# Patient Record
Sex: MALE | Race: WHITE | Employment: OTHER | URBAN - NONMETROPOLITAN AREA
[De-identification: names, ages, dates, MRNs, and addresses within clinical notes are randomized per-mention and may not be internally consistent; named-entity substitution may affect disease eponyms.]

---

## 2021-06-02 ENCOUNTER — HOSPITAL ENCOUNTER (OUTPATIENT)
Dept: GENERAL RADIOLOGY | Age: 81
Discharge: HOME | End: 2021-06-02
Payer: MEDICARE

## 2021-06-02 ENCOUNTER — HOSPITAL ENCOUNTER (OUTPATIENT)
Age: 81
Discharge: HOME | End: 2021-06-02
Payer: MEDICARE

## 2021-06-02 DIAGNOSIS — M79.602 PAIN IN LEFT ARM: ICD-10-CM

## 2021-06-02 PROCEDURE — 73140 X-RAY EXAM OF FINGER(S): CPT

## 2023-01-25 ENCOUNTER — HOSPITAL ENCOUNTER (OUTPATIENT)
Age: 83
Discharge: HOME | End: 2023-01-25
Payer: MEDICARE

## 2023-01-25 LAB
ANION GAP SERPL CALC-SCNC: 10 MEQ/L (ref 8–16)
BASOPHILS ABSOLUTE: 0 THOU/MM3 (ref 0–0.1)
BASOPHILS NFR BLD AUTO: 0.5 %
BUN SERPL-MCNC: 12 MG/DL (ref 7–22)
CALCIUM SERPL-MCNC: 9.1 MG/DL (ref 8.5–10.5)
CHLORIDE SERPL-SCNC: 102 MEQ/L (ref 98–111)
CO2 SERPL-SCNC: 28 MEQ/L (ref 23–33)
CREAT SERPL-MCNC: 0.8 MG/DL (ref 0.4–1.2)
DEPRECATED RDW RBC AUTO: 44.9 FL (ref 35–45)
EOSINOPHIL NFR BLD AUTO: 2.6 %
EOSINOPHILS ABSOLUTE: 0.2 THOU/MM3 (ref 0–0.4)
ERYTHROCYTE [DISTWIDTH] IN BLOOD BY AUTOMATED COUNT: 13.4 % (ref 11.5–14.5)
GFR SERPL CREATININE-BSD FRML MDRD: > 60 ML/MIN/1.73M2
GLUCOSE SERPL-MCNC: 95 MG/DL (ref 70–108)
HCT VFR BLD AUTO: 47.2 % (ref 42–52)
HGB BLD-MCNC: 15.8 GM/DL (ref 14–18)
IMM GRANULOCYTES # BLD AUTO: 0.03 THOU/MM3 (ref 0–0.07)
IMM GRANULOCYTES NFR BLD AUTO: 0.4 %
LYMPHOCYTES ABSOLUTE: 2.1 THOU/MM3 (ref 1–4.8)
LYMPHOCYTES NFR BLD AUTO: 27.8 %
MCH RBC QN AUTO: 30.8 PG (ref 26–33)
MCHC RBC AUTO-ENTMCNC: 33.5 GM/DL (ref 32.2–35.5)
MCV RBC AUTO: 92 FL (ref 80–94)
MONOCYTES ABSOLUTE: 0.5 THOU/MM3 (ref 0.4–1.3)
MONOCYTES NFR BLD AUTO: 7.3 %
NEUTROPHILS NFR BLD AUTO: 61.4 %
NRBC BLD AUTO-RTO: 0 /100 WBC
PLATELET # BLD AUTO: 171 THOU/MM3 (ref 130–400)
PMV BLD AUTO: 11.3 FL (ref 9.4–12.4)
POTASSIUM SERPL-SCNC: 5 MEQ/L (ref 3.5–5.2)
RBC # BLD AUTO: 5.13 MILL/MM3 (ref 4.7–6.1)
SEGMENTED NEUTROPHILS ABSOLUTE COUNT: 4.5 THOU/MM3 (ref 1.8–7.7)
SODIUM SERPL-SCNC: 140 MEQ/L (ref 135–145)
WBC # BLD AUTO: 7.4 THOU/MM3 (ref 4.8–10.8)

## 2023-01-25 PROCEDURE — 93005 ELECTROCARDIOGRAM TRACING: CPT | Performed by: ORTHOPAEDIC SURGERY

## 2023-01-25 PROCEDURE — 85025 COMPLETE CBC W/AUTO DIFF WBC: CPT

## 2023-01-25 PROCEDURE — 36415 COLL VENOUS BLD VENIPUNCTURE: CPT

## 2023-01-25 PROCEDURE — 80048 BASIC METABOLIC PNL TOTAL CA: CPT

## 2023-01-26 LAB
EKG ATRIAL RATE: 65 BPM
EKG P AXIS: 40 DEGREES
EKG P-R INTERVAL: 266 MS
EKG Q-T INTERVAL: 436 MS
EKG QRS DURATION: 86 MS
EKG QTC CALCULATION (BAZETT): 453 MS
EKG R AXIS: -18 DEGREES
EKG T AXIS: -13 DEGREES
EKG VENTRICULAR RATE: 65 BPM

## 2023-01-26 PROCEDURE — 93010 ELECTROCARDIOGRAM REPORT: CPT | Performed by: INTERNAL MEDICINE

## 2023-02-08 NOTE — PROGRESS NOTES
PAT call attempted, patient unavailable, left message to please call us back at your earliest convenience; [phone]

## 2023-02-16 ENCOUNTER — HOSPITAL ENCOUNTER (OUTPATIENT)
Age: 83
Setting detail: OUTPATIENT SURGERY
Discharge: HOME | End: 2023-02-16
Attending: ORTHOPAEDIC SURGERY | Admitting: ORTHOPAEDIC SURGERY
Payer: MEDICARE

## 2023-02-16 ENCOUNTER — ANESTHESIA EVENT (OUTPATIENT)
Dept: OPERATING ROOM | Age: 83
End: 2023-02-16
Payer: MEDICARE

## 2023-02-16 ENCOUNTER — ANESTHESIA (OUTPATIENT)
Dept: OPERATING ROOM | Age: 83
End: 2023-02-16
Payer: MEDICARE

## 2023-02-16 VITALS
BODY MASS INDEX: 29.06 KG/M2 | TEMPERATURE: 96.5 F | HEIGHT: 69 IN | RESPIRATION RATE: 16 BRPM | WEIGHT: 196.2 LBS | SYSTOLIC BLOOD PRESSURE: 147 MMHG | DIASTOLIC BLOOD PRESSURE: 70 MMHG | OXYGEN SATURATION: 94 % | HEART RATE: 66 BPM

## 2023-02-16 DIAGNOSIS — G89.18 POSTOPERATIVE PAIN: Primary | ICD-10-CM

## 2023-02-16 PROCEDURE — 3600000013 HC SURGERY LEVEL 3 ADDTL 15MIN: Performed by: ORTHOPAEDIC SURGERY

## 2023-02-16 PROCEDURE — 3700000001 HC ADD 15 MINUTES (ANESTHESIA): Performed by: ORTHOPAEDIC SURGERY

## 2023-02-16 PROCEDURE — 2709999900 HC NON-CHARGEABLE SUPPLY: Performed by: ORTHOPAEDIC SURGERY

## 2023-02-16 PROCEDURE — 2580000003 HC RX 258: Performed by: ORTHOPAEDIC SURGERY

## 2023-02-16 PROCEDURE — 2500000003 HC RX 250 WO HCPCS: Performed by: ORTHOPAEDIC SURGERY

## 2023-02-16 PROCEDURE — 2720000010 HC SURG SUPPLY STERILE: Performed by: ORTHOPAEDIC SURGERY

## 2023-02-16 PROCEDURE — 3600000003 HC SURGERY LEVEL 3 BASE: Performed by: ORTHOPAEDIC SURGERY

## 2023-02-16 PROCEDURE — 6360000002 HC RX W HCPCS: Performed by: ORTHOPAEDIC SURGERY

## 2023-02-16 PROCEDURE — 6360000002 HC RX W HCPCS: Performed by: ANESTHESIOLOGY

## 2023-02-16 PROCEDURE — 7100000011 HC PHASE II RECOVERY - ADDTL 15 MIN: Performed by: ORTHOPAEDIC SURGERY

## 2023-02-16 PROCEDURE — 7100000010 HC PHASE II RECOVERY - FIRST 15 MIN: Performed by: ORTHOPAEDIC SURGERY

## 2023-02-16 PROCEDURE — 3700000000 HC ANESTHESIA ATTENDED CARE: Performed by: ORTHOPAEDIC SURGERY

## 2023-02-16 PROCEDURE — 6370000000 HC RX 637 (ALT 250 FOR IP): Performed by: ORTHOPAEDIC SURGERY

## 2023-02-16 RX ORDER — HYDROCODONE BITARTRATE AND ACETAMINOPHEN 5; 325 MG/1; MG/1
1 TABLET ORAL EVERY 6 HOURS PRN
Qty: 10 TABLET | Refills: 0 | Status: SHIPPED | OUTPATIENT
Start: 2023-02-16 | End: 2023-02-19

## 2023-02-16 RX ORDER — LIDOCAINE HYDROCHLORIDE AND EPINEPHRINE BITARTRATE 20; .01 MG/ML; MG/ML
INJECTION, SOLUTION SUBCUTANEOUS PRN
Status: DISCONTINUED | OUTPATIENT
Start: 2023-02-16 | End: 2023-02-16 | Stop reason: ALTCHOICE

## 2023-02-16 RX ORDER — FENTANYL CITRATE 50 UG/ML
INJECTION, SOLUTION INTRAMUSCULAR; INTRAVENOUS PRN
Status: DISCONTINUED | OUTPATIENT
Start: 2023-02-16 | End: 2023-02-16 | Stop reason: SDUPTHER

## 2023-02-16 RX ORDER — PROPOFOL 10 MG/ML
INJECTION, EMULSION INTRAVENOUS CONTINUOUS PRN
Status: DISCONTINUED | OUTPATIENT
Start: 2023-02-16 | End: 2023-02-16 | Stop reason: SDUPTHER

## 2023-02-16 RX ORDER — SODIUM CHLORIDE, SODIUM LACTATE, POTASSIUM CHLORIDE, CALCIUM CHLORIDE 600; 310; 30; 20 MG/100ML; MG/100ML; MG/100ML; MG/100ML
INJECTION, SOLUTION INTRAVENOUS CONTINUOUS
Status: DISCONTINUED | OUTPATIENT
Start: 2023-02-16 | End: 2023-02-16 | Stop reason: HOSPADM

## 2023-02-16 RX ORDER — HYDROCODONE BITARTRATE AND ACETAMINOPHEN 5; 325 MG/1; MG/1
1 TABLET ORAL ONCE
Status: COMPLETED | OUTPATIENT
Start: 2023-02-16 | End: 2023-02-16

## 2023-02-16 RX ORDER — ROPIVACAINE HYDROCHLORIDE 5 MG/ML
INJECTION, SOLUTION EPIDURAL; INFILTRATION; PERINEURAL
Status: COMPLETED | OUTPATIENT
Start: 2023-02-16 | End: 2023-02-16

## 2023-02-16 RX ADMIN — SODIUM CHLORIDE, POTASSIUM CHLORIDE, SODIUM LACTATE AND CALCIUM CHLORIDE: 600; 310; 30; 20 INJECTION, SOLUTION INTRAVENOUS at 08:25

## 2023-02-16 RX ADMIN — FENTANYL CITRATE 50 MCG: 50 INJECTION, SOLUTION INTRAMUSCULAR; INTRAVENOUS at 08:50

## 2023-02-16 RX ADMIN — Medication 2000 MG: at 09:26

## 2023-02-16 RX ADMIN — PROPOFOL 70 MCG/KG/MIN: 10 INJECTION, EMULSION INTRAVENOUS at 09:24

## 2023-02-16 RX ADMIN — ROPIVACAINE HYDROCHLORIDE 30 ML: 5 INJECTION, SOLUTION EPIDURAL; INFILTRATION; PERINEURAL at 08:55

## 2023-02-16 RX ADMIN — HYDROCODONE BITARTRATE AND ACETAMINOPHEN 1 TABLET: 5; 325 TABLET ORAL at 10:46

## 2023-02-16 ASSESSMENT — PAIN SCALES - GENERAL
PAINLEVEL_OUTOF10: 4
PAINLEVEL_OUTOF10: 6

## 2023-02-16 ASSESSMENT — PAIN DESCRIPTION - LOCATION: LOCATION: ARM

## 2023-02-16 ASSESSMENT — PAIN DESCRIPTION - ORIENTATION: ORIENTATION: RIGHT

## 2023-02-16 ASSESSMENT — PAIN - FUNCTIONAL ASSESSMENT: PAIN_FUNCTIONAL_ASSESSMENT: 0-10

## 2023-02-16 NOTE — ANESTHESIA POSTPROCEDURE EVALUATION
Department of Anesthesiology  Postprocedure Note    Patient: Namrata Gracia  MRN: [id number]  YOB: 1940  Date of evaluation: 2/16/2023      Procedure Summary     Date: 02/16/23 Room / Location: [address] [facility]    Anesthesia Start: 0920 Anesthesia Stop: 3296    Procedure: Right Endoscopic Carpal Tunnel Release Cubital Tunnel Release (Right) Diagnosis:       Right carpal tunnel syndrome      Cubital tunnel syndrome, right      (Right carpal tunnel syndrome [G56.01])      (Cubital tunnel syndrome, right [G56.21])    Surgeons: Kenneth Guerra DO Responsible Provider: Morris Esteban MD    Anesthesia Type: MAC, regional ASA Status: 3          Anesthesia Type: No value filed.     Nicola Phase I: Nicola Score: 10    Nicola Phase II:        Anesthesia Post Evaluation    Patient location during evaluation: PACU  Patient participation: complete - patient participated  Level of consciousness: awake and alert  Airway patency: patent  Nausea & Vomiting: no nausea  Complications: no  Cardiovascular status: blood pressure returned to baseline and hemodynamically stable  Respiratory status: acceptable and spontaneous ventilation  Hydration status: euvolemic

## 2023-02-16 NOTE — ANESTHESIA PROCEDURE NOTES
Peripheral Block    Patient location during procedure: pre-op  Reason for block: primary anesthetic and at surgeon's request  Start time: 2/16/2023 8:50 AM  End time: 2/16/2023 8:55 AM  Staffing  Performed: anesthesiologist   Anesthesiologist: Edilson La MD  Preanesthetic Checklist  Completed: patient identified, IV checked, site marked, risks and benefits discussed, surgical/procedural consents, equipment checked, pre-op evaluation, timeout performed, anesthesia consent given, oxygen available, monitors applied/VS acknowledged, fire risk safety assessment completed and verbalized and blood product R/B/A discussed and consented  Peripheral Block   Patient position: sitting  Prep: ChloraPrep  Provider prep: sterile gloves and mask  Patient monitoring: cardiac monitor, continuous pulse ox, frequent blood pressure checks and responsive to questions  Block type: Brachial plexus  Supraclavicular  Laterality: right  Injection technique: single-shot  Guidance: ultrasound guided  Local infiltration: lidocaine  Infiltration strength: 1 %  Local infiltration: lidocaine  Dose: 2 mL    Needle   Needle type: short-bevel   Needle gauge: 22 G  Needle localization: ultrasound guidance  Needle length: 5 cm  Assessment   Injection assessment: negative aspiration for heme, no paresthesia on injection, local visualized surrounding nerve on ultrasound and no intravascular symptoms  Paresthesia pain: none  Slow fractionated injection: yes  Hemodynamics: stableno  Outcomes: uncomplicated    Additional Notes  Intercostobrachial nerve block (right side) local infiltration with 10 mL Ropivacaine 0.2%  Medications Administered  ropivacaine (NAROPIN) injection 0.5% - Perineural   30 mL - 2/16/2023 8:55:00 AM

## 2023-02-16 NOTE — PROGRESS NOTES

## 2023-02-16 NOTE — BRIEF OP NOTE
Brief Postoperative Note      Patient: Ron Riggs  YOB: 1940  MRN: [id number]    Date of Procedure: 2/16/2023    Pre-Op Diagnosis: Right carpal tunnel syndrome [G56.01]  Cubital tunnel syndrome, right [G56.21]    Post-Op Diagnosis: Same       Procedure(s):  Right Endoscopic Carpal Tunnel Release Cubital Tunnel Release    Surgeon(s):  Ramana Tim DO    Assistant:  Physician Assistant:  Nacho Modi PA-C    Anesthesia: Monitor Anesthesia Care    Estimated Blood Loss (mL): Minimal    Complications: None    Specimens:   * No specimens in log *    Implants:  * No implants in log *      Drains: * No LDAs found *    Findings: postop diagnosis confirmed    Electronically signed by Dorcas Dumas PA-C on 2/16/2023 at 10:24 AM

## 2023-02-16 NOTE — PROGRESS NOTES
830 pt to PACU for supraclavicular block  Dr Cox speaking to pt and explained procedure  839 Timeout performed   840 Block started  847 block complete , pt tolerated with no problems  900 waiting on surgery , denies any needs  920 pt taken to surgery

## 2023-02-16 NOTE — H&P
Update History & Physical    The patient's History and Physical of January 20, 2023 was reviewed with the patient and I examined the patient. There was no change. The surgical site was confirmed by the patient and me. Plan: The risks, benefits, expected outcome, and alternative to the recommended procedure have been discussed with the patient. Patient understands and wants to proceed with the procedure.      Electronically signed by Dorcas Dumas PA-C on 2/16/2023 at 7:47 AM

## 2023-02-16 NOTE — DISCHARGE INSTRUCTIONS
Orthopedic Discharge Instructions:  Weight bearing status: No lifting, pushing or pulling for the right arm. Keep dressing clean and dry. Starting 3 days after surgery, Ok for daily dressing changes until wound is dry. Then leave open to air. Ice (20 minutes on and off 1 hour) and elevate as needed to reduce swelling and throbbing pain. If Dressing allowed to be removed, Starting 3 days after surgery, if wound is no longer leaking, Ok to shower but no soaks or baths. Advance diet as tolerated: begin with clear liquids. Drink plenty of fluids. Call the office or come to Emergency Room if signs of infection appear (hot, swollen, red, draining pus, fever)  Take medications as prescribed. Wean off narcotics (percocet/norco) as soon as possible. Do not take tylenol if still taking narcotics. Follow up with Dr. Nasrin Charles in his office in 10-14 days after surgery. Call [phone] ext [phone] to schedule/confirm.

## 2023-02-16 NOTE — ANESTHESIA PRE PROCEDURE
Department of Anesthesiology  Preprocedure Note       Name:  Mike Rich   Age:  79 y.o.  :  1940                                          MRN:  [id number]         Date:  2023      Surgeon: Lisa Cho):  Joe Dumont DO    Procedure: Procedure(s):  Right Endoscopic Carpal Tunnel Release Cubital Tunnel Release    Medications prior to admission:   Prior to Admission medications    Medication Sig Start Date End Date Taking? Authorizing Provider   fluticasone (FLONASE) 50 MCG/ACT nasal spray 2 sprays by Nasal route daily. 4/22/15 4/21/16  Bridgette Owen MD   aspirin 81 MG tablet Take 162 mg by mouth daily. Patient not taking: Reported on 2023    Historical Provider, MD   naproxen (NAPROSYN) 500 MG tablet Take 1 tablet by mouth 2 times daily for 7 days. 14  Devyn Argueta MD   triamcinolone (KENALOG) 0.1 % ointment Apply  topically 2 times daily. Apply topically 2 times daily. Historical Provider, MD   temazepam (RESTORIL) 15 MG capsule Take 15 mg by mouth nightly as needed for Sleep. Patient not taking: Reported on 2023    Historical Provider, MD   ALPRAZolam Jack Rising) 0.25 MG tablet Take 0.25 mg by mouth nightly as needed for Sleep. Patient not taking: Reported on 2023    Historical Provider, MD   hydrocortisone (ANUSOL-HC) 25 MG suppository Place 1 suppository rectally every 12 hours. 12   Osmany Castaneda MD   lisinopril (PRINIVIL;ZESTRIL) 10 MG tablet Take 10 mg by mouth daily. Indications: blood pressure  Patient not taking: Reported on 2023    Historical Provider, MD   simvastatin (ZOCOR) 40 MG tablet Take 40 mg by mouth nightly.     Indications: cholesterol    Historical Provider, MD       Current medications:    Current Facility-Administered Medications   Medication Dose Route Frequency Provider Last Rate Last Admin    ceFAZolin (ANCEF) 2000 mg in 0.9% sodium chloride 50 mL IVPB  2,000 mg IntraVENous 30 Min Pre-Op Joe Dumont DO  lactated ringers IV soln infusion   IntraVENous Continuous Clem Jacinto,  mL/hr at 02/16/23 0825 New Bag at 02/16/23 0825       Allergies:     Allergies   Allergen Reactions    Sulfa Antibiotics Hives    Bactrim Rash       Problem List:    Patient Active Problem List   Diagnosis Code    Diarrhea R19.7    Hx of colonic polyps Z86.010    Proctitis, 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    Obstructive sleep apnea on CPAP G47.33, Z99.89       Past Medical History:        Diagnosis Date    Cancer ([facility] [address]     prostate    Hyperlipidemia     Hypertension     Kidney stone        Past Surgical History:        Procedure Laterality Date    CARDIAC CATHETERIZATION  1997    COLONOSCOPY      routinely    KNEE ARTHROPLASTY  June 2011    right    KNEE ARTHROSCOPY      multiple    MALIGNANT SKIN LESION EXCISION      PROSTATECTOMY      PYELOPLASTY      left       Social History:    Social History     Tobacco Use    Smoking status: Former    Smokeless tobacco: Never    Tobacco comments:     quit 35-40 years ago   Substance Use Topics    Alcohol use: Yes     Comment: weekend social                                 Counseling given: Not Answered  Tobacco comments: quit 35-40 years ago      Vital Signs (Current):   Vitals:    02/16/23 0818 02/16/23 0845 02/16/23 0850   BP: (!) 174/85  (!) 193/90   Pulse: 72 68 65   Resp: 16 20 20   Temp: (!) 96.6 °F (35.9 °C)     TempSrc: Temporal     SpO2: 96% 97% 98%   Weight: 196 lb 3.2 oz (89 kg)     Height: 5' 9\" (1.753 m)                                                BP Readings from Last 3 Encounters:   02/16/23 (!) 193/90   04/22/15 142/76   10/10/14 150/74       NPO Status: Time of last liquid consumption: 0600                        Time of last solid consumption: 1800                        Date of last liquid consumption: 02/16/23                        Date of last solid food consumption: 02/15/23    BMI:   Wt Readings from Last 3 Encounters:   02/16/23 196 lb 3.2 oz (89 kg) 04/22/15 193 lb (87.5 kg)   10/10/14 192 lb 14.4 oz (87.5 kg)     Body mass index is 28.97 kg/m². CBC:   Lab Results   Component Value Date/Time    WBC 7.4 01/25/2023 02:11 PM    RBC 5.13 01/25/2023 02:11 PM    RBC 5.40 07/18/2022 04:00 PM    HGB 15.8 01/25/2023 02:11 PM    HCT 47.2 01/25/2023 02:11 PM    MCV 92.0 01/25/2023 02:11 PM    RDW 12.7 07/18/2022 04:00 PM     01/25/2023 02:11 PM       CMP:   Lab Results   Component Value Date/Time     01/25/2023 02:11 PM    K 5.0 01/25/2023 02:11 PM     01/25/2023 02:11 PM    CO2 28 01/25/2023 02:11 PM    BUN 12 01/25/2023 02:11 PM    CREATININE 0.8 01/25/2023 02:11 PM    LABGLOM >60 01/25/2023 02:11 PM    GLUCOSE 95 01/25/2023 02:11 PM    GLUCOSE 99 07/18/2022 04:00 PM    PROT 7.3 07/18/2022 04:00 PM    CALCIUM 9.1 01/25/2023 02:11 PM    BILITOT 2.9 07/18/2022 04:00 PM    ALKPHOS 79 07/18/2022 04:00 PM    AST 22 07/18/2022 04:00 PM    ALT 18 07/18/2022 04:00 PM       POC Tests: No results for input(s): POCGLU, POCNA, POCK, POCCL, POCBUN, POCHEMO, POCHCT in the last 72 hours. Coags: No results found for: PROTIME, INR, APTT    HCG (If Applicable): No results found for: PREGTESTUR, PREGSERUM, HCG, HCGQUANT     ABGs: No results found for: PHART, PO2ART, LOQ6GYL, NRZ8HFN, BEART, V2XJYZTH     Type & Screen (If Applicable):  No results found for: LABABO, LABRH    Drug/Infectious Status (If Applicable):  No results found for: HIV, HEPCAB    COVID-19 Screening (If Applicable): No results found for: COVID19        Anesthesia Evaluation   no history of anesthetic complications:   Airway: Mallampati: II  TM distance: >3 FB   Neck ROM: full  Mouth opening: > = 3 FB   Dental:          Pulmonary:normal exam    (+) sleep apnea:            Patient did not smoke on day of surgery.                  Cardiovascular:  Exercise tolerance: good (>4 METS),   (+) hypertension:,                   Neuro/Psych:   Negative Neuro/Psych ROS              GI/Hepatic/Renal: Neg GI/Hepatic/Renal ROS            Endo/Other: Negative Endo/Other ROS             Pt had no PAT visit       Abdominal:             Vascular: negative vascular ROS. Other Findings:           Anesthesia Plan      MAC and regional     ASA 3       Induction: intravenous. Anesthetic plan and risks discussed with patient. Plan discussed with CRNA.                     Gosia Melendez MD   2/16/2023

## 2023-02-16 NOTE — PROGRESS NOTES
Pt returned to [facility] room 14. Vitals and assessment as charted. LR infusing, @550ml to count from PACU. Pt has crackers and water. Family at the bedside. Pt and family verbalized understanding of discharge criteria and call light use. Call light in reach.

## 2023-02-17 NOTE — OP NOTE
[address] [place] [address]                                OPERATIVE REPORT    PATIENT NAME: SHELBY GLORIA                       :        1940  MED REC NO:   [id number]                           ROOM:  ACCOUNT NO:   [de-identified]                           ADMIT DATE: 2023  PROVIDER:     Justa Gan D.O.    DATE OF PROCEDURE:  2023    PREOPERATIVE DIAGNOSES:  Right carpal tunnel syndrome and right cubital  tunnel syndrome. POSTOPERATIVE DIAGNOSES:  Right carpal tunnel syndrome and right cubital  tunnel syndrome. OPERATIONS PERFORMED:  Right endoscopic carpal tunnel release and right  cubital tunnel release. SURGEON:  Justa Gan DO    ASSISTANT:  Nacho Modi PA-C, who assisted with positioning,  retraction, closure, and dressing application. TYPE OF ANESTHESIA:  Regional with sedation. ESTIMATED BLOOD LOSS:  Minimal.    TOURNIQUET TIME:  23 minutes. INDICATIONS FOR OPERATION:  The patient is an 23-year-old male with  well-documented signs and symptoms of carpal and cubital tunnel  syndrome. I recommended surgical intervention. Risks, benefits, and  alternatives were reviewed. The patient elected to proceed. OPERATIVE SUMMARY:  With the patient in the preop holding area and  correct extremity was identified and marked, informed consent was  obtained. A nerve block was performed per the Anesthesia team.  The  patient was escorted to the operative suite and prepped and draped in  standard surgical fashion. Time-out was taken. Correct patient,  procedure, and operative site were agreed upon by all who were present. Exsanguinated the arm with an Esmarch bandage. Tourniquet set to 250  mmHg. I made a transverse incision in the proximal wrist flexion crease  adjacent ulnar to palmaris longus tendon. Blunt dissection was carried  down to the fascia.   The fascia was opened and retracted distally. I  gained into the carpal tunnel with spatula. I scraped the undersurface  of ligament free of adhesions. I inserted a dilator followed by the  endoscopic device. I had great visualization of ligament. Ligament was  transected from distal to proximal about 75%. Pictures were taken  confirming the distal release. I then transected the remainder of the  ligament with care not to cut the skin on the way out. I directly  visualized the proximal release. The scope was re-introduced, both  limbs of ligament hanging freely. The wound was irrigated and closed  with a 4-0 Monocryl suture. I then turned my attention to elbow and  made a curved incision directly over the ulnar nerve between the medial  epicondyle and olecranon. Blunt dissection was carried down to the  fascia overlying the nerve. Crossing branches of the medial  antebrachial cutaneous nerve were identified and protected. I then  released the fascia over the ulnar nerve about 8 cm proximal to the  medial epicondyle extending between the two heads of the FCU. Nerve was  very sensitive in the area of its entrance into the two heads of the  FCU. Once a complete in situ release was performed, elbow was taken  through a range of motion. There was no snapping or subluxation of the  nerve. Bipolar cautery was used judiciously throughout the case. The  wound was irrigated. The tourniquet was let down at 23 minutes. The  skin was closed with 3-0 Vicryl and 4-0 Monocryl in subcuticular  fashion. Sterile dressing was applied. The patient was awakened from  anesthesia. He was transferred to PACU in stable condition. There were  no complications. He will follow up in two weeks for suture removal.    Nacho Modi PA-C, assisted throughout the procedure with  positioning, draping, retraction, wound closure, dressing, and splint  application.         Valeria Menjivar D.O.    D: 02/16/2023 10:46:40       T: 02/16/2023 10:50:51     [id number]  Job#: [id number]     Doc#: [id number]    CC:

## 2023-07-20 ENCOUNTER — ANESTHESIA (OUTPATIENT)
Dept: OPERATING ROOM | Age: 83
End: 2023-07-20
Payer: MEDICARE

## 2023-07-20 ENCOUNTER — ANESTHESIA EVENT (OUTPATIENT)
Dept: OPERATING ROOM | Age: 83
End: 2023-07-20
Payer: MEDICARE

## 2023-07-20 ENCOUNTER — HOSPITAL ENCOUNTER (OUTPATIENT)
Age: 83
Setting detail: OUTPATIENT SURGERY
Discharge: HOME | End: 2023-07-20
Attending: ORTHOPAEDIC SURGERY | Admitting: ORTHOPAEDIC SURGERY
Payer: MEDICARE

## 2023-07-20 VITALS
WEIGHT: 193.38 LBS | SYSTOLIC BLOOD PRESSURE: 163 MMHG | DIASTOLIC BLOOD PRESSURE: 75 MMHG | RESPIRATION RATE: 18 BRPM | TEMPERATURE: 96.5 F | HEIGHT: 69 IN | HEART RATE: 61 BPM | BODY MASS INDEX: 28.64 KG/M2 | OXYGEN SATURATION: 97 %

## 2023-07-20 PROCEDURE — 3600000013 HC SURGERY LEVEL 3 ADDTL 15MIN: Performed by: ORTHOPAEDIC SURGERY

## 2023-07-20 PROCEDURE — 6360000002 HC RX W HCPCS: Performed by: NURSE ANESTHETIST, CERTIFIED REGISTERED

## 2023-07-20 PROCEDURE — 7100000011 HC PHASE II RECOVERY - ADDTL 15 MIN: Performed by: ORTHOPAEDIC SURGERY

## 2023-07-20 PROCEDURE — 2580000003 HC RX 258: Performed by: ORTHOPAEDIC SURGERY

## 2023-07-20 PROCEDURE — 3700000000 HC ANESTHESIA ATTENDED CARE: Performed by: ORTHOPAEDIC SURGERY

## 2023-07-20 PROCEDURE — 7100000010 HC PHASE II RECOVERY - FIRST 15 MIN: Performed by: ORTHOPAEDIC SURGERY

## 2023-07-20 PROCEDURE — 3700000001 HC ADD 15 MINUTES (ANESTHESIA): Performed by: ORTHOPAEDIC SURGERY

## 2023-07-20 PROCEDURE — 2500000003 HC RX 250 WO HCPCS: Performed by: ORTHOPAEDIC SURGERY

## 2023-07-20 PROCEDURE — 2720000010 HC SURG SUPPLY STERILE: Performed by: ORTHOPAEDIC SURGERY

## 2023-07-20 PROCEDURE — 6360000002 HC RX W HCPCS: Performed by: STUDENT IN AN ORGANIZED HEALTH CARE EDUCATION/TRAINING PROGRAM

## 2023-07-20 PROCEDURE — 64415 NJX AA&/STRD BRCH PLXS IMG: CPT | Performed by: STUDENT IN AN ORGANIZED HEALTH CARE EDUCATION/TRAINING PROGRAM

## 2023-07-20 PROCEDURE — 3600000003 HC SURGERY LEVEL 3 BASE: Performed by: ORTHOPAEDIC SURGERY

## 2023-07-20 PROCEDURE — 2709999900 HC NON-CHARGEABLE SUPPLY: Performed by: ORTHOPAEDIC SURGERY

## 2023-07-20 RX ORDER — ROPIVACAINE HYDROCHLORIDE 5 MG/ML
INJECTION, SOLUTION EPIDURAL; INFILTRATION; PERINEURAL
Status: DISCONTINUED | OUTPATIENT
Start: 2023-07-20 | End: 2023-07-20 | Stop reason: SDUPTHER

## 2023-07-20 RX ORDER — SODIUM CHLORIDE, SODIUM LACTATE, POTASSIUM CHLORIDE, CALCIUM CHLORIDE 600; 310; 30; 20 MG/100ML; MG/100ML; MG/100ML; MG/100ML
INJECTION, SOLUTION INTRAVENOUS CONTINUOUS
Status: DISCONTINUED | OUTPATIENT
Start: 2023-07-20 | End: 2023-07-20 | Stop reason: HOSPADM

## 2023-07-20 RX ORDER — OMEPRAZOLE 20 MG/1
20 TABLET, DELAYED RELEASE ORAL DAILY
COMMUNITY
Start: 2021-06-25

## 2023-07-20 RX ORDER — FENTANYL CITRATE 50 UG/ML
INJECTION, SOLUTION INTRAMUSCULAR; INTRAVENOUS PRN
Status: DISCONTINUED | OUTPATIENT
Start: 2023-07-20 | End: 2023-07-20 | Stop reason: SDUPTHER

## 2023-07-20 RX ORDER — SODIUM PHOSPHATE,MONO-DIBASIC 19G-7G/118
1 ENEMA (ML) RECTAL DAILY
COMMUNITY
Start: 2021-06-25

## 2023-07-20 RX ORDER — TRIAMCINOLONE ACETONIDE 1 MG/G
CREAM TOPICAL
COMMUNITY
Start: 2023-07-18

## 2023-07-20 RX ORDER — PROPOFOL 10 MG/ML
INJECTION, EMULSION INTRAVENOUS CONTINUOUS PRN
Status: DISCONTINUED | OUTPATIENT
Start: 2023-07-20 | End: 2023-07-20 | Stop reason: SDUPTHER

## 2023-07-20 RX ORDER — LIDOCAINE HYDROCHLORIDE AND EPINEPHRINE 10; 10 MG/ML; UG/ML
INJECTION, SOLUTION INFILTRATION; PERINEURAL PRN
Status: DISCONTINUED | OUTPATIENT
Start: 2023-07-20 | End: 2023-07-20 | Stop reason: ALTCHOICE

## 2023-07-20 RX ADMIN — FENTANYL CITRATE 50 MCG: 50 INJECTION, SOLUTION INTRAMUSCULAR; INTRAVENOUS at 11:02

## 2023-07-20 RX ADMIN — SODIUM CHLORIDE, POTASSIUM CHLORIDE, SODIUM LACTATE AND CALCIUM CHLORIDE: 600; 310; 30; 20 INJECTION, SOLUTION INTRAVENOUS at 10:58

## 2023-07-20 RX ADMIN — PROPOFOL 50 MCG/KG/MIN: 10 INJECTION, EMULSION INTRAVENOUS at 11:16

## 2023-07-20 RX ADMIN — ROPIVACAINE HYDROCHLORIDE 30 ML: 5 INJECTION, SOLUTION EPIDURAL; INFILTRATION; PERINEURAL at 11:05

## 2023-07-20 ASSESSMENT — PAIN SCALES - GENERAL
PAINLEVEL_OUTOF10: 0

## 2023-07-20 NOTE — ANESTHESIA POSTPROCEDURE EVALUATION
Department of Anesthesiology  Postprocedure Note    Patient: Corinna Crouch  MRN: [id number]  YOB: 1940  Date of evaluation: 7/20/2023      Procedure Summary     Date: 07/20/23 Room / Location: [facility]    Anesthesia Start: 1112 Anesthesia Stop: 1207    Procedure: Left Endoscopic Carpal Tunnel Release, Cubital Tunnel Release (Left) Diagnosis:       Carpal tunnel syndrome of left wrist      (Carpal tunnel syndrome of left wrist [G56.02])    Surgeons: Abraham Astudillo DO Responsible Provider: Constanza Weinstein DO    Anesthesia Type: MAC, regional ASA Status: 3          Anesthesia Type: No value filed.     Nicola Phase I: Nicola Score: 10    Nicola Phase II: Nicola Score: 10      Anesthesia Post Evaluation    Patient location during evaluation: PACU  Patient participation: complete - patient participated  Level of consciousness: awake and alert  Airway patency: patent  Nausea & Vomiting: no vomiting and no nausea  Complications: no  Cardiovascular status: hemodynamically stable  Respiratory status: acceptable  Hydration status: stable

## 2023-07-20 NOTE — DISCHARGE INSTRUCTIONS
Orthopedic Discharge Instructions:  Weight bearing status: No lifting, pushing or pulling for the left arm. Keep dressing clean and dry. Starting 3 days after surgery, Ok for daily dressing changes until wound is dry. Then leave open to air. Ice (20 minutes on and off 1 hour) and elevate as needed to reduce swelling and throbbing pain. If Dressing allowed to be removed, Starting 3 days after surgery, if wound is no longer leaking, Ok to shower but no soaks or baths. Advance diet as tolerated: begin with clear liquids. Drink plenty of fluids. Call the office or come to Emergency Room if signs of infection appear (hot, swollen, red, draining pus, fever)  Take medications as prescribed. Wean off narcotics (percocet/norco) as soon as possible. Do not take tylenol if still taking narcotics. Follow up with Dr. Annei Lira in his office in 10-14 days after surgery. Call [phone] ext [phone] to schedule/confirm.

## 2023-07-20 NOTE — BRIEF OP NOTE
Brief Postoperative Note      Patient: Glenroy Aranda  YOB: 1940  MRN: [id number]    Date of Procedure: 7/20/2023    Pre-Op Diagnosis Codes:     * Carpal tunnel syndrome of left wrist [G56.02], left cubital tunnel syndrome    Post-Op Diagnosis: Same       Procedure(s):  Left Endoscopic Carpal Tunnel Release, Cubital Tunnel Release    Surgeon(s):  Vibha Bhatt DO    Assistant:  Physician Assistant:  Nacho Modi PA-C    Anesthesia: Monitor Anesthesia Care    Estimated Blood Loss (mL): Minimal    Complications: None    Specimens:   * No specimens in log *    Implants:  * No implants in log *      Drains: * No LDAs found *    Findings: Postop diagnosis confirmed      Electronically signed by Edwin Azul PA-C on 7/20/2023 at 12:04 PM

## 2023-07-20 NOTE — H&P
Family History:  Family History   Problem Relation Age of Onset    Cancer Mother     Cancer Sister     Cancer Brother         seamus    Cancer Sister          REVIEW OF SYSTEMS:  General: No fever or chills  Respiratory: no cough or wheezing  Cardiovascular: no chest pain or dyspnea   Gastrointestinal ROS: no nausea no vomiting   MSK: Left hand numbness and tingling    PHYSICAL EXAM:  Height 5' 9\" (1.753 m), weight 190 lb (86.2 kg). Gen: alert and oriented  Head: normocephalic and atraumatic   Neck: supple  Chest: Non labored breathing   Heart: Reg rate   Extremity:LUE: Skin intact. Soft compartments. 2+ rad pulse. AIN/PIN/rad/u/med motor intact. RMU SILT. Positive carpal and cubital tunnel Tinel's and wrist and elbow flexion testing. LABS:  No results for input(s): WBC, HGB, HCT, PLT, INR, PTT, NA, K, BUN, CREATININE, GLUCOSE in the last 72 hours.     Invalid input(s): PT     Radiology:   N/A    A/P: 79 y.o. male with carpal and cubital tunnel syndrome on the left side  RBA's to surgery discussed   Pt elected to proceed  Site marked and patient consented  To OR for left endoscopic carpal tunnel release and cubital tunnel release with possible subcutaneous ulnar nerve transposition  NPO  Abx OCTOR  Post op Plan: Plans to discharge home postoperatively      Electronically signed by Denton Zamarripa PA-C on 7/20/2023 at 7:17 AM

## 2023-07-20 NOTE — PROGRESS NOTES
Pt returned to [facility] room 12. Vitals and assessment as charted. 0.9 infusing, @950ml to count from PACU. Pt has crackers and pop. Family at the bedside. Pt and family verbalized understanding of discharge criteria and call light use. Call light in reach.

## 2023-07-20 NOTE — ANESTHESIA PROCEDURE NOTES
Peripheral Block    Patient location during procedure: PACU  Reason for block: post-op pain management  Start time: 7/20/2023 11:05 AM  End time: 7/20/2023 11:07 AM  Staffing  Performed: anesthesiologist   Anesthesiologist: Mian Mcintyre DO  Preanesthetic Checklist  Completed: patient identified, IV checked, site marked, risks and benefits discussed, surgical/procedural consents, equipment checked, pre-op evaluation, timeout performed, anesthesia consent given, oxygen available, monitors applied/VS acknowledged, fire risk safety assessment completed and verbalized and blood product R/B/A discussed and consented  Peripheral Block   Patient position: supine  Prep: ChloraPrep  Provider prep: mask and sterile gloves  Patient monitoring: cardiac monitor, continuous pulse ox, IV access and responsive to questions  Block type: Brachial plexus  Supraclavicular  Laterality: left  Injection technique: single-shot  Guidance: ultrasound guided    Needle   Needle type: insulated echogenic nerve stimulator needle   Needle gauge: 20 G  Needle localization: ultrasound guidance  Needle length: 10 cm  Assessment   Injection assessment: negative aspiration for heme, no paresthesia on injection, local visualized surrounding nerve on ultrasound and no intravascular symptoms  Paresthesia pain: none  Slow fractionated injection: yes  Hemodynamics: stableno  Outcomes: uncomplicated and patient tolerated procedure well    Medications Administered  ropivacaine (NAROPIN) injection 0.5% - Perineural   30 mL - 7/20/2023 11:05:00 AM

## 2023-07-20 NOTE — PROGRESS NOTES
Pt has met discharge criteria and states he is ready for discharge to home. IV removed, gauze and tape applied. Dressed in own clothes and personal belongings gathered. Discharge instructions (with opioid medication education information) given to pt and family; pt and family verbalized understanding of discharge instructions, prescriptions and follow up appointments. Pt transported to discharge lobby by [facility] staff.

## 2023-07-21 NOTE — OP NOTE
[place] [address]                                OPERATIVE REPORT    PATIENT NAME: SHELBY GLORIA                       :        1940  MED REC NO:   [id number]                           ROOM:  ACCOUNT NO:   [de-identified]                           ADMIT DATE: 2023  PROVIDER:     Orville Iverson D.O.    DATE OF PROCEDURE:  2023    PREOPERATIVE DIAGNOSES:  Left carpal tunnel syndrome and cubital tunnel  syndrome. POSTOPERATIVE DIAGNOSES:  Left carpal tunnel syndrome and cubital tunnel  syndrome. OPERATIONS PERFORMED:  Left endoscopic carpal tunnel release and left  cubital tunnel release. SURGEON:  Orville Iverosn DO    ASSISTANT:  ZACHARIAH Francis, who assisted with positioning,  retraction, closure, and dressing application. TYPE OF ANESTHESIA:  Regional with sedation. ESTIMATED BLOOD LOSS:  Minimal.    TOURNIQUET TIME:  15 minutes. INDICATIONS FOR OPERATION:  The patient is an 50-year-old male,  well-documented signs and symptoms of carpal and cubital tunnel  syndrome. I recommended carpal and cubital tunnel releases. Risks,  benefits, and alternatives were reviewed. The patient elected to  proceed. OPERATIVE SUMMARY:  The patient was met in the preop holding area and  the correct extremity was identified and marked. Informed consent was  obtained. A nerve block was performed per the anesthesia team.  The  patient was escorted to the operative suite and sedation was  administered. He was prepped and draped in standard surgical fashion. Time-out was taken. Correct patient, procedure, and operative side were  agreed upon by all who were present. I exsanguinated the arm with an  Esmarch bandage. Tourniquet was set at 250 mmHg. I began with the  carpal tunnel release.   I made a transverse incision adjacent from ulnar  to the palmaris longus tendon and in line with the third

## 2025-05-20 ENCOUNTER — HOSPITAL ENCOUNTER (EMERGENCY)
Age: 85
Discharge: HOME | End: 2025-05-20
Attending: FAMILY MEDICINE
Payer: MEDICARE

## 2025-05-20 ENCOUNTER — TELEPHONE (OUTPATIENT)
Dept: ENT CLINIC | Age: 85
End: 2025-05-20

## 2025-05-20 VITALS
DIASTOLIC BLOOD PRESSURE: 51 MMHG | BODY MASS INDEX: 28.79 KG/M2 | RESPIRATION RATE: 18 BRPM | OXYGEN SATURATION: 96 % | WEIGHT: 190 LBS | HEIGHT: 68 IN | SYSTOLIC BLOOD PRESSURE: 142 MMHG | TEMPERATURE: 97.7 F | HEART RATE: 82 BPM

## 2025-05-20 DIAGNOSIS — R04.0 EPISTAXIS: Primary | ICD-10-CM

## 2025-05-20 DIAGNOSIS — F51.01 PRIMARY INSOMNIA: ICD-10-CM

## 2025-05-20 PROCEDURE — 30903 CONTROL OF NOSEBLEED: CPT

## 2025-05-20 PROCEDURE — 6370000000 HC RX 637 (ALT 250 FOR IP): Performed by: FAMILY MEDICINE

## 2025-05-20 PROCEDURE — 99283 EMERGENCY DEPT VISIT LOW MDM: CPT

## 2025-05-20 PROCEDURE — 2500000003 HC RX 250 WO HCPCS: Performed by: FAMILY MEDICINE

## 2025-05-20 RX ORDER — TRANEXAMIC ACID 100 MG/ML
500 INJECTION, SOLUTION INTRAVENOUS
Status: DISCONTINUED | OUTPATIENT
Start: 2025-05-20 | End: 2025-05-20 | Stop reason: HOSPADM

## 2025-05-20 RX ORDER — TRAZODONE HYDROCHLORIDE 50 MG/1
50 TABLET ORAL NIGHTLY
Qty: 5 TABLET | Refills: 0 | Status: SHIPPED | OUTPATIENT
Start: 2025-05-20 | End: 2025-05-25

## 2025-05-20 RX ADMIN — PHENYLEPHRINE HYDROCHLORIDE 1 SPRAY: 1 SPRAY NASAL at 12:10

## 2025-05-20 RX ADMIN — TRANEXAMIC ACID 500 MG: 100 INJECTION, SOLUTION INTRAVENOUS at 12:11

## 2025-05-20 ASSESSMENT — PAIN DESCRIPTION - LOCATION: LOCATION: NOSE

## 2025-05-20 ASSESSMENT — PAIN DESCRIPTION - DESCRIPTORS: DESCRIPTORS: ACHING

## 2025-05-20 ASSESSMENT — PAIN SCALES - GENERAL: PAINLEVEL_OUTOF10: 8

## 2025-05-20 ASSESSMENT — PAIN DESCRIPTION - ORIENTATION: ORIENTATION: RIGHT

## 2025-05-20 ASSESSMENT — PAIN - FUNCTIONAL ASSESSMENT
PAIN_FUNCTIONAL_ASSESSMENT: 0-10
PAIN_FUNCTIONAL_ASSESSMENT: NONE - DENIES PAIN

## 2025-05-20 ASSESSMENT — PAIN DESCRIPTION - PAIN TYPE: TYPE: ACUTE PAIN

## 2025-05-20 NOTE — DISCHARGE INSTRUCTIONS
Adam Cortes,    It has been my absolute pleasure to serve you while in the emergency department at [facility] today.  Please follow-up with ENT on 5/23/2025.  Take antibiotics as prescribed.  Please do remember to take all medications as prescribed.  Please make sure you follow-up with your PCP within 7 days.  Please follow-up with any and all specialists as we discussed.  Please return to the ER in case of any worsening of symptoms.  I wish you a speedy recovery!    Sincerely,    Dr. Ted Koenig MD.     You can access the FollowMyHealth Patient Portal offered by [facility] by registering at the following website: [email or web address]. By joining [employer]’s FollowMyHealth portal, you will also be able to view your health information using other applications (apps) compatible with our system.

## 2025-05-20 NOTE — TELEPHONE ENCOUNTER
Pt was seen by ED for epistaxis with rhinorocket in place. Pt was initially advised to return to ED Friday given there are no providers in office Friday or Monday, however he would like to wait to be seen Tuesday if possible instead of going back to ER.    Please call pt to schedule him as a new pt at 1120 with me on 4/27. Please reiterate that he should be sure to take the antibiotics and for earlier removal he will have to be seen in ED.

## 2025-05-20 NOTE — TELEPHONE ENCOUNTER
Called patient to advise him as to what Pema said. Patient verbalized understanding and thanked me. Scheduled appointment with patient for 5/27/25 at 11:20 as well.

## 2025-05-20 NOTE — ED NOTES
Nose clamp on nose and blood still coming from right and left nares. Nose clamp still on and myself pinching the nose to stop the bleeding.

## (undated) DEVICE — BANDAGE COMPR E SGL LAYERED CLSR BGE W/ CLP W4INXL15FT

## (undated) DEVICE — BLADE OPHTH ORNG GRINDLESS SMALLER ALTERNATIVE TO NO15 GEN

## (undated) DEVICE — DRESSING,GAUZE,XEROFORM,CURAD,5"X9",ST: Brand: CURAD

## (undated) DEVICE — GLOVE ORANGE PI 7 1/2   MSG9075

## (undated) DEVICE — GOWN,SIRUS,NONRNF,SETINSLV,XL,20/CS: Brand: MEDLINE

## (undated) DEVICE — PADDING,UNDERCAST,COTTON, 4"X4YD STERILE: Brand: MEDLINE

## (undated) DEVICE — SLING ARM L L165IN D75IN WHT POLY MESH ENVELOP MTL SIDE

## (undated) DEVICE — GAUZE,SPONGE,8"X4",12PLY,XRAY,STRL,LF: Brand: MEDLINE

## (undated) DEVICE — SUTURE NONABSORBABLE MONOFILAMENT 4-0 FS-2 18 IN ETHILON 662H

## (undated) DEVICE — BANDAGE COMPR W4INXL12FT E DISP ESMARCH EVEN

## (undated) DEVICE — STANDARD (U) BLADE ASSEMBLY 1PK: Brand: MICROAIRE®

## (undated) DEVICE — SYRINGE,EAR/ULCER, 2 OZ, STERILE: Brand: MEDLINE

## (undated) DEVICE — PACK-MAJOR

## (undated) DEVICE — KIT,ANTI FOG,W/SPONGE & FLUID,SOFT PACK: Brand: MEDLINE

## (undated) DEVICE — SYRINGE IRRIG 60ML SFT PLIABLE BLB EZ TO GRP 1 HND USE W/

## (undated) DEVICE — BLADE,CARBON-STEEL,15,STRL,DISPOSABLE,TB: Brand: MEDLINE

## (undated) DEVICE — PENCIL SMK EVAC ALL IN 1 DSGN ENH VISIBILITY IMPROVED AIR

## (undated) DEVICE — 60-7075-104 TRNQT,SPSB,PLC GREEN: Brand: MEDLINE RENEWAL

## (undated) DEVICE — SPONGE GZ W4XL4IN COT 12 PLY TYP VII WVN C FLD DSGN STERILE

## (undated) DEVICE — 3M™ STERI-DRAPE™ INSTRUMENT POUCH 1018: Brand: STERI-DRAPE™

## (undated) DEVICE — 3M™ STERI-DRAPE™ U-DRAPE 1015: Brand: STERI-DRAPE™

## (undated) DEVICE — DRAPE,EXTREMITY,89X128,STERILE: Brand: MEDLINE

## (undated) DEVICE — 1010 S-DRAPE TOWEL DRAPE 10/BX: Brand: STERI-DRAPE™

## (undated) DEVICE — GLOVE ORANGE PI 8   MSG9080

## (undated) DEVICE — CORD,CAUTERY,BIPOLAR,STERILE: Brand: MEDLINE

## (undated) DEVICE — INTENDED FOR TISSUE SEPARATION, AND OTHER PROCEDURES THAT REQUIRE A SHARP SURGICAL BLADE TO PUNCTURE OR CUT.: Brand: BARD-PARKER ® CARBON RIB-BACK BLADES

## (undated) DEVICE — GLOVE SURG SZ 75 L12IN THK75MIL DK GRN LTX FREE

## (undated) DEVICE — PACK PROCEDURE SURG SET UP SRMC